# Patient Record
Sex: FEMALE | Race: WHITE | NOT HISPANIC OR LATINO | Employment: FULL TIME
[De-identification: names, ages, dates, MRNs, and addresses within clinical notes are randomized per-mention and may not be internally consistent; named-entity substitution may affect disease eponyms.]

---

## 2017-06-03 ENCOUNTER — HEALTH MAINTENANCE LETTER (OUTPATIENT)
Age: 27
End: 2017-06-03

## 2024-08-02 ENCOUNTER — HOSPITAL ENCOUNTER (EMERGENCY)
Facility: CLINIC | Age: 34
Discharge: HOME | End: 2024-08-02
Attending: EMERGENCY MEDICINE | Admitting: EMERGENCY MEDICINE
Payer: COMMERCIAL

## 2024-08-02 VITALS
SYSTOLIC BLOOD PRESSURE: 141 MMHG | HEART RATE: 100 BPM | OXYGEN SATURATION: 99 % | RESPIRATION RATE: 16 BRPM | DIASTOLIC BLOOD PRESSURE: 83 MMHG | TEMPERATURE: 98.3 F

## 2024-08-02 DIAGNOSIS — R20.2 PARESTHESIAS: ICD-10-CM

## 2024-08-02 PROCEDURE — 99282 EMERGENCY DEPT VISIT SF MDM: CPT

## 2024-08-02 ASSESSMENT — COLUMBIA-SUICIDE SEVERITY RATING SCALE - C-SSRS
2. HAVE YOU ACTUALLY HAD ANY THOUGHTS OF KILLING YOURSELF IN THE PAST MONTH?: NO
1. IN THE PAST MONTH, HAVE YOU WISHED YOU WERE DEAD OR WISHED YOU COULD GO TO SLEEP AND NOT WAKE UP?: NO
6. HAVE YOU EVER DONE ANYTHING, STARTED TO DO ANYTHING, OR PREPARED TO DO ANYTHING TO END YOUR LIFE?: NO

## 2024-08-02 ASSESSMENT — ACTIVITIES OF DAILY LIVING (ADL)
ADLS_ACUITY_SCORE: 33
ADLS_ACUITY_SCORE: 33

## 2024-08-02 NOTE — ED PROVIDER NOTES
"  Emergency Department Note      History of Present Illness     Chief Complaint   warm sensation to face and bilat forearms      HPI   Swapna CHRIS is a 34 year old female with history of anxiety who presents for warm sensation to face and bilateral forearms. Patient started on Zoloft yesterday morning around 0800. Reports her forearms felt cold on Monday this week but have felt warm since last night. The warm sensation is also in both sides of her face. Also reports a \"pins and needles\" sensation in her bilateral forearms since Wednesday morning. She states that the sensations do not feel cold to touch but feel cold in her mind. She reports her forearms and face do not feel asleep but she feels \"more aware\" of them. Patient has experienced this before and believes it was due to anxiety. Reports diarrhea earlier this week but denies recent illness. She recently had a full blood workup at [facility].     Independent Historian   None    Review of External Notes   Reviewed the labs from her line of visit the other day through [facility], she had comprehensive negative labs including CBC, LFTs, TSH    Past Medical History     Medical History and Problem List   Hx of recurrent UTIs    Medications   Apri  Astelin  Zoloft  Lactobacillus    Surgical History   Hernia repair    Physical Exam     Patient Vitals for the past 24 hrs:   BP Temp Temp src Pulse Resp SpO2   08/02/24 1147 (!) 141/83 98.3  F (36.8  C) Oral 100 16 99 %     Physical Exam  Constitutional: Alert, attentive, GCS 15   Eyes: EOM are normal, anicteric, conjugate gaze  CV: distal extremities warm, well perfused  Chest: Non-labored breathing on RA  GI:  non tender. No distension. No guarding or rebound.    Skin: Skin is warm and dry.  Neurological:   GCS 15; A/Ox3; Cranial nerves 2-12 intact;   5/5 strength throughout the upper and lower extremities;   sensation intact to light touch throughout the upper and lower extremities;   normal fine motor " coordination intact bilaterally;   normal gait     Diagnostics     Independent Interpretation   None    ED Course      Discussion of Management   None    ED Course   ED Course as of 08/02/24 1551   Fri Aug 02, 2024   1229 I obtained history and examined the patient as noted above.       Additional Documentation  None    Medical Decision Making / Diagnosis     CMS Diagnoses: None    MIPS       None    MDM   Swapna CHRIS is a 34 year old female past medical history significant for anxiety presenting for evaluation of intermittent migratory paresthesias of her upper extremities and face.  She was recently started on Zoloft earlier this week for anxiety, she did have the is symptoms prior to starting Zoloft but Zoloft seems to have made it worse.  In review of the literature, paresthesias current about 2% of patients on Zoloft compared to 1% in the placebo arm.  I did review with her that my suspicion for stroke, or demyelinating disease is very low especially given the rapid nature in which her symptoms will migrate and move.   she is already had comprehensive lab evaluations done the other day which were unrevealing.  Suspect thrombin time reassuring her that likely that this is a neurodegenerative disease or stroke is low, recommended continued monitoring and follow-up with her PCP.    Disposition   The patient was discharged.     Diagnosis     ICD-10-CM    1. Paresthesias  R20.2            Shawn Rico MD  [employer]  3:51 PM 08/02/24       Scribe Disclosure:  I, Leticia Min, am serving as a scribe at 12:00 PM on 8/2/2024 to document services personally performed by Shawn Rico MD based on my observations and the provider's statements to me.        Shawn Rico MD  08/02/24 6015

## 2024-08-02 NOTE — DISCHARGE INSTRUCTIONS
According to the literature, there is a 1 to 2% chance of paresthesias associated with Zoloft use, I will leave it to you to decide whether you want to continue taking this or discuss further with your primary doctor.  I do not think it is harmful to continue taking it but if the symptoms seem to bother you, switching to a different medication would be reasonable.  I do recommend following up with your primary doctor for recheck in the next few weeks.

## 2024-08-02 NOTE — ED TRIAGE NOTES
Pt recently started on Zoloft and today feels warm sensation to face and forearms     Triage Assessment (Adult)       Row Name 08/02/24 1148          Triage Assessment    Airway WDL WDL        Respiratory WDL    Respiratory WDL WDL        Skin Circulation/Temperature WDL    Skin Circulation/Temperature WDL WDL        Cardiac WDL    Cardiac WDL WDL        Peripheral/Neurovascular WDL    Peripheral Neurovascular WDL WDL        Cognitive/Neuro/Behavioral WDL    Cognitive/Neuro/Behavioral WDL WDL